# Patient Record
Sex: MALE | Race: OTHER | HISPANIC OR LATINO
[De-identification: names, ages, dates, MRNs, and addresses within clinical notes are randomized per-mention and may not be internally consistent; named-entity substitution may affect disease eponyms.]

---

## 2018-08-15 PROBLEM — Z00.00 ENCOUNTER FOR PREVENTIVE HEALTH EXAMINATION: Status: ACTIVE | Noted: 2018-08-15

## 2018-08-16 ENCOUNTER — APPOINTMENT (OUTPATIENT)
Dept: GASTROENTEROLOGY | Facility: CLINIC | Age: 38
End: 2018-08-16
Payer: COMMERCIAL

## 2018-08-16 VITALS
SYSTOLIC BLOOD PRESSURE: 110 MMHG | HEART RATE: 87 BPM | HEIGHT: 73 IN | DIASTOLIC BLOOD PRESSURE: 93 MMHG | BODY MASS INDEX: 41.75 KG/M2 | WEIGHT: 315 LBS

## 2018-08-16 DIAGNOSIS — Z83.71 FAMILY HISTORY OF COLONIC POLYPS: ICD-10-CM

## 2018-08-16 DIAGNOSIS — R07.89 OTHER CHEST PAIN: ICD-10-CM

## 2018-08-16 DIAGNOSIS — Z83.79 FAMILY HISTORY OF OTHER DISEASES OF THE DIGESTIVE SYSTEM: ICD-10-CM

## 2018-08-16 DIAGNOSIS — R14.0 ABDOMINAL DISTENSION (GASEOUS): ICD-10-CM

## 2018-08-16 DIAGNOSIS — Z84.89 FAMILY HISTORY OF OTHER SPECIFIED CONDITIONS: ICD-10-CM

## 2018-08-16 DIAGNOSIS — R13.10 DYSPHAGIA, UNSPECIFIED: ICD-10-CM

## 2018-08-16 DIAGNOSIS — Z80.0 FAMILY HISTORY OF MALIGNANT NEOPLASM OF DIGESTIVE ORGANS: ICD-10-CM

## 2018-08-16 DIAGNOSIS — Z86.59 PERSONAL HISTORY OF OTHER MENTAL AND BEHAVIORAL DISORDERS: ICD-10-CM

## 2018-08-16 DIAGNOSIS — Z87.891 PERSONAL HISTORY OF NICOTINE DEPENDENCE: ICD-10-CM

## 2018-08-16 DIAGNOSIS — Z80.3 FAMILY HISTORY OF MALIGNANT NEOPLASM OF BREAST: ICD-10-CM

## 2018-08-16 DIAGNOSIS — M15.9 POLYOSTEOARTHRITIS, UNSPECIFIED: ICD-10-CM

## 2018-08-16 PROCEDURE — 82274 ASSAY TEST FOR BLOOD FECAL: CPT | Mod: QW

## 2018-08-16 PROCEDURE — 99244 OFF/OP CNSLTJ NEW/EST MOD 40: CPT

## 2018-08-17 ENCOUNTER — APPOINTMENT (OUTPATIENT)
Dept: GASTROENTEROLOGY | Facility: CLINIC | Age: 38
End: 2018-08-17

## 2018-11-22 ENCOUNTER — TRANSCRIPTION ENCOUNTER (OUTPATIENT)
Age: 38
End: 2018-11-22

## 2022-12-12 ENCOUNTER — APPOINTMENT (OUTPATIENT)
Dept: INTERNAL MEDICINE | Facility: CLINIC | Age: 42
End: 2022-12-12

## 2022-12-12 ENCOUNTER — NON-APPOINTMENT (OUTPATIENT)
Age: 42
End: 2022-12-12

## 2022-12-12 VITALS
HEIGHT: 73 IN | RESPIRATION RATE: 18 BRPM | SYSTOLIC BLOOD PRESSURE: 135 MMHG | WEIGHT: 315 LBS | HEART RATE: 72 BPM | OXYGEN SATURATION: 97 % | BODY MASS INDEX: 41.75 KG/M2 | DIASTOLIC BLOOD PRESSURE: 84 MMHG | TEMPERATURE: 97.3 F

## 2022-12-12 DIAGNOSIS — S86.011A STRAIN OF RIGHT ACHILLES TENDON, INITIAL ENCOUNTER: ICD-10-CM

## 2022-12-12 DIAGNOSIS — Z76.89 PERSONS ENCOUNTERING HEALTH SERVICES IN OTHER SPECIFIED CIRCUMSTANCES: ICD-10-CM

## 2022-12-12 DIAGNOSIS — Z83.3 FAMILY HISTORY OF DIABETES MELLITUS: ICD-10-CM

## 2022-12-12 PROCEDURE — 99203 OFFICE O/P NEW LOW 30 MIN: CPT

## 2022-12-12 RX ORDER — RANITIDINE HYDROCHLORIDE 150 MG/1
150 TABLET, FILM COATED ORAL
Refills: 0 | Status: DISCONTINUED | COMMUNITY
End: 2022-12-12

## 2022-12-12 RX ORDER — OMEPRAZOLE MAGNESIUM 40 MG/1
40 CAPSULE, DELAYED RELEASE ORAL
Refills: 0 | Status: DISCONTINUED | COMMUNITY
End: 2022-12-12

## 2022-12-12 NOTE — HISTORY OF PRESENT ILLNESS
[de-identified] : 42 years old male here to establish care, he refers h/o right Achilles tendon tear, requiring surgery, schedule for surgery next week,needs medical clearance [FreeTextEntry1] : establish care

## 2022-12-12 NOTE — PLAN
[FreeTextEntry1] : * routine general labs done\par * age appropriate health maintenance recommendations reviewed, discussed including healthy diet, regular exercise\par * patient will have preoperative labs tomorrow\par * will fax MCA form to complete\par * follow up one month.

## 2022-12-12 NOTE — HEALTH RISK ASSESSMENT
[Good] : ~his/her~  mood as  good [Former] : Former [5-9] : 5-9 [No] : No [No falls in past year] : Patient reported no falls in the past year [0] : 2) Feeling down, depressed, or hopeless: Not at all (0) [PHQ-2 Negative - No further assessment needed] : PHQ-2 Negative - No further assessment needed [HIV test declined] : HIV test declined [Hepatitis C test declined] : Hepatitis C test declined [None] : None [With Family] : lives with family [Employed] : employed [College] : College [] :  [Sexually Active] : sexually active [Feels Safe at Home] : Feels safe at home [Fully functional (bathing, dressing, toileting, transferring, walking, feeding)] : Fully functional (bathing, dressing, toileting, transferring, walking, feeding) [Fully functional (using the telephone, shopping, preparing meals, housekeeping, doing laundry, using] : Fully functional and needs no help or supervision to perform IADLs (using the telephone, shopping, preparing meals, housekeeping, doing laundry, using transportation, managing medications and managing finances) [Smoke Detector] : smoke detector [Carbon Monoxide Detector] : carbon monoxide detector [Seat Belt] :  uses seat belt [YearQuit] : 2008 [JEG5Nojad] : 0 [Change in mental status noted] : No change in mental status noted [Reports changes in hearing] : Reports no changes in hearing [Reports changes in vision] : Reports no changes in vision [Reports changes in dental health] : Reports no changes in dental health [Guns at Home] : no guns at home [FreeTextEntry2] :

## 2022-12-13 ENCOUNTER — OUTPATIENT (OUTPATIENT)
Dept: OUTPATIENT SERVICES | Facility: HOSPITAL | Age: 42
LOS: 1 days | End: 2022-12-13
Payer: COMMERCIAL

## 2022-12-13 VITALS
WEIGHT: 315 LBS | DIASTOLIC BLOOD PRESSURE: 84 MMHG | SYSTOLIC BLOOD PRESSURE: 135 MMHG | TEMPERATURE: 97 F | HEIGHT: 73 IN | RESPIRATION RATE: 13 BRPM | HEART RATE: 80 BPM | OXYGEN SATURATION: 98 %

## 2022-12-13 DIAGNOSIS — Z01.818 ENCOUNTER FOR OTHER PREPROCEDURAL EXAMINATION: ICD-10-CM

## 2022-12-13 DIAGNOSIS — S86.011A STRAIN OF RIGHT ACHILLES TENDON, INITIAL ENCOUNTER: ICD-10-CM

## 2022-12-13 DIAGNOSIS — M65.28 CALCIFIC TENDINITIS, OTHER SITE: ICD-10-CM

## 2022-12-13 DIAGNOSIS — Z87.09 PERSONAL HISTORY OF OTHER DISEASES OF THE RESPIRATORY SYSTEM: Chronic | ICD-10-CM

## 2022-12-13 LAB
25(OH)D3 SERPL-MCNC: 32.8 NG/ML
ALBUMIN SERPL ELPH-MCNC: 4.3 G/DL
ALP BLD-CCNC: 77 U/L
ALT SERPL-CCNC: 38 U/L
ANION GAP SERPL CALC-SCNC: 13 MMOL/L
APPEARANCE: CLEAR
AST SERPL-CCNC: 27 U/L
BACTERIA: NEGATIVE
BASOPHILS # BLD AUTO: 0.05 K/UL
BASOPHILS NFR BLD AUTO: 0.6 %
BILIRUB SERPL-MCNC: 0.6 MG/DL
BILIRUBIN URINE: NEGATIVE
BLOOD URINE: NEGATIVE
BUN SERPL-MCNC: 11 MG/DL
CALCIUM SERPL-MCNC: 9.4 MG/DL
CHLORIDE SERPL-SCNC: 100 MMOL/L
CHOLEST SERPL-MCNC: 191 MG/DL
CO2 SERPL-SCNC: 27 MMOL/L
COLOR: NORMAL
CREAT SERPL-MCNC: 0.88 MG/DL
EGFR: 110 ML/MIN/1.73M2
EOSINOPHIL # BLD AUTO: 0.15 K/UL
EOSINOPHIL NFR BLD AUTO: 1.7 %
ESTIMATED AVERAGE GLUCOSE: 105 MG/DL
GLUCOSE QUALITATIVE U: NEGATIVE
GLUCOSE SERPL-MCNC: 78 MG/DL
HBA1C MFR BLD HPLC: 5.3 %
HCT VFR BLD CALC: 48.9 %
HDLC SERPL-MCNC: 55 MG/DL
HGB BLD-MCNC: 15.2 G/DL
HYALINE CASTS: 0 /LPF
IMM GRANULOCYTES NFR BLD AUTO: 0.3 %
KETONES URINE: NEGATIVE
LDLC SERPL CALC-MCNC: 115 MG/DL
LEUKOCYTE ESTERASE URINE: NEGATIVE
LYMPHOCYTES # BLD AUTO: 2.04 K/UL
LYMPHOCYTES NFR BLD AUTO: 22.7 %
MAN DIFF?: NORMAL
MCHC RBC-ENTMCNC: 29 PG
MCHC RBC-ENTMCNC: 31.1 GM/DL
MCV RBC AUTO: 93.3 FL
MICROSCOPIC-UA: NORMAL
MONOCYTES # BLD AUTO: 0.78 K/UL
MONOCYTES NFR BLD AUTO: 8.7 %
NEUTROPHILS # BLD AUTO: 5.93 K/UL
NEUTROPHILS NFR BLD AUTO: 66 %
NITRITE URINE: NEGATIVE
NONHDLC SERPL-MCNC: 137 MG/DL
PH URINE: 6
PLATELET # BLD AUTO: 308 K/UL
POTASSIUM SERPL-SCNC: 4.1 MMOL/L
PROT SERPL-MCNC: 7.4 G/DL
PROTEIN URINE: NEGATIVE
RBC # BLD: 5.24 M/UL
RBC # FLD: 13.2 %
RED BLOOD CELLS URINE: 2 /HPF
SODIUM SERPL-SCNC: 139 MMOL/L
SPECIFIC GRAVITY URINE: 1.02
SQUAMOUS EPITHELIAL CELLS: 0 /HPF
T4 FREE SERPL-MCNC: 1.2 NG/DL
TRIGL SERPL-MCNC: 108 MG/DL
TSH SERPL-ACNC: 0.49 UIU/ML
UROBILINOGEN URINE: NORMAL
VIT B12 SERPL-MCNC: 893 PG/ML
WBC # FLD AUTO: 8.98 K/UL
WHITE BLOOD CELLS URINE: 1 /HPF

## 2022-12-13 PROCEDURE — G0463: CPT

## 2022-12-13 RX ORDER — VALACYCLOVIR 500 MG/1
1 TABLET, FILM COATED ORAL
Qty: 0 | Refills: 0 | DISCHARGE

## 2022-12-13 NOTE — H&P PST ADULT - ATTENDING COMMENTS
the patient is seen and evaluated  risks and benefits have been discussed   consent has been obtained  will plan on surgery

## 2022-12-13 NOTE — H&P PST ADULT - NSICDXFAMILYHX_GEN_ALL_CORE_FT
FAMILY HISTORY:  Father  Still living? No  Family history of liver failure, Age at diagnosis: Age Unknown    Mother  Still living? Unknown  Family history of diabetes mellitus, Age at diagnosis: Age Unknown    Sibling  Still living? Yes, Estimated age: Age Unknown  Family history of diabetes mellitus, Age at diagnosis: Age Unknown    Aunt  Still living? No  Family history- stomach cancer, Age at diagnosis: Age Unknown    Uncle  Still living? No  Family history of brain tumor, Age at diagnosis: Age Unknown

## 2022-12-13 NOTE — H&P PST ADULT - NSICDXPASTMEDICALHX_GEN_ALL_CORE_FT
PAST MEDICAL HISTORY:  Achilles rupture, right     COVID-19 vaccine series completed     Dysphagia     Genital herpes     Morbid obesity with BMI of 45.0-49.9, adult     Sleep apnea

## 2022-12-13 NOTE — H&P PST ADULT - PROBLEM SELECTOR PLAN 1
Right achilles tendon repair is planned for 12/21/2022  Covid testing is scheduled for 12/19/2022 @ [facility]  Medical clearance requested  pre op instructions were reviewed  best wishes offered

## 2022-12-13 NOTE — H&P PST ADULT - HISTORY OF PRESENT ILLNESS
42 male reports pain in right heel since 9/2022.  He is diagnosed with right achilles tendon rupture.   He is scheduled for right achilles tendon repair on 12/21/2022 @ [facility].

## 2022-12-13 NOTE — H&P PST ADULT - MUSCULOSKELETAL
details… no joint swelling/no joint erythema/no joint warmth/no calf tenderness/no chest wall tenderness

## 2022-12-19 ENCOUNTER — OUTPATIENT (OUTPATIENT)
Dept: OUTPATIENT SERVICES | Facility: HOSPITAL | Age: 42
LOS: 1 days | End: 2022-12-19
Payer: COMMERCIAL

## 2022-12-19 DIAGNOSIS — Z87.09 PERSONAL HISTORY OF OTHER DISEASES OF THE RESPIRATORY SYSTEM: Chronic | ICD-10-CM

## 2022-12-19 DIAGNOSIS — Z20.828 CONTACT WITH AND (SUSPECTED) EXPOSURE TO OTHER VIRAL COMMUNICABLE DISEASES: ICD-10-CM

## 2022-12-19 LAB — SARS-COV-2 RNA SPEC QL NAA+PROBE: SIGNIFICANT CHANGE UP

## 2022-12-19 PROCEDURE — U0005: CPT

## 2022-12-19 PROCEDURE — U0003: CPT

## 2022-12-20 ENCOUNTER — TRANSCRIPTION ENCOUNTER (OUTPATIENT)
Age: 42
End: 2022-12-20

## 2022-12-20 RX ORDER — CHLORHEXIDINE GLUCONATE 213 G/1000ML
1 SOLUTION TOPICAL DAILY
Refills: 0 | Status: DISCONTINUED | OUTPATIENT
Start: 2022-12-21 | End: 2023-01-04

## 2022-12-21 ENCOUNTER — TRANSCRIPTION ENCOUNTER (OUTPATIENT)
Age: 42
End: 2022-12-21

## 2022-12-21 ENCOUNTER — OUTPATIENT (OUTPATIENT)
Dept: OUTPATIENT SERVICES | Facility: HOSPITAL | Age: 42
LOS: 1 days | End: 2022-12-21
Payer: COMMERCIAL

## 2022-12-21 VITALS
RESPIRATION RATE: 16 BRPM | DIASTOLIC BLOOD PRESSURE: 75 MMHG | HEART RATE: 69 BPM | SYSTOLIC BLOOD PRESSURE: 125 MMHG | OXYGEN SATURATION: 96 %

## 2022-12-21 VITALS
DIASTOLIC BLOOD PRESSURE: 87 MMHG | TEMPERATURE: 99 F | SYSTOLIC BLOOD PRESSURE: 157 MMHG | HEIGHT: 73 IN | OXYGEN SATURATION: 94 % | HEART RATE: 85 BPM | WEIGHT: 315 LBS | RESPIRATION RATE: 15 BRPM

## 2022-12-21 DIAGNOSIS — Z87.09 PERSONAL HISTORY OF OTHER DISEASES OF THE RESPIRATORY SYSTEM: Chronic | ICD-10-CM

## 2022-12-21 DIAGNOSIS — M65.28 CALCIFIC TENDINITIS, OTHER SITE: ICD-10-CM

## 2022-12-21 PROCEDURE — 76000 FLUOROSCOPY <1 HR PHYS/QHP: CPT

## 2022-12-21 PROCEDURE — 27650 REPAIR ACHILLES TENDON: CPT | Mod: RT

## 2022-12-21 PROCEDURE — C9399: CPT

## 2022-12-21 PROCEDURE — 28119 REMOVAL OF HEEL SPUR: CPT | Mod: RT

## 2022-12-21 PROCEDURE — C1713: CPT

## 2022-12-21 DEVICE — IMP SYS BIO-COMP ACHILLES SPEEDBRIDGE: Type: IMPLANTABLE DEVICE | Site: RIGHT | Status: FUNCTIONAL

## 2022-12-21 RX ORDER — HYDROMORPHONE HYDROCHLORIDE 2 MG/ML
1 INJECTION INTRAMUSCULAR; INTRAVENOUS; SUBCUTANEOUS
Refills: 0 | Status: DISCONTINUED | OUTPATIENT
Start: 2022-12-21 | End: 2022-12-22

## 2022-12-21 RX ORDER — OXYCODONE HYDROCHLORIDE 5 MG/1
5 TABLET ORAL ONCE
Refills: 0 | Status: DISCONTINUED | OUTPATIENT
Start: 2022-12-21 | End: 2022-12-22

## 2022-12-21 RX ORDER — HYDROMORPHONE HYDROCHLORIDE 2 MG/ML
0.5 INJECTION INTRAMUSCULAR; INTRAVENOUS; SUBCUTANEOUS
Refills: 0 | Status: DISCONTINUED | OUTPATIENT
Start: 2022-12-21 | End: 2022-12-22

## 2022-12-21 RX ORDER — ONDANSETRON 8 MG/1
4 TABLET, FILM COATED ORAL ONCE
Refills: 0 | Status: DISCONTINUED | OUTPATIENT
Start: 2022-12-21 | End: 2022-12-22

## 2022-12-21 RX ORDER — SODIUM CHLORIDE 9 MG/ML
1000 INJECTION, SOLUTION INTRAVENOUS
Refills: 0 | Status: DISCONTINUED | OUTPATIENT
Start: 2022-12-21 | End: 2022-12-22

## 2022-12-21 RX ORDER — APREPITANT 80 MG/1
40 CAPSULE ORAL ONCE
Refills: 0 | Status: COMPLETED | OUTPATIENT
Start: 2022-12-21 | End: 2022-12-21

## 2022-12-21 RX ORDER — OXYCODONE HYDROCHLORIDE 5 MG/1
1 TABLET ORAL
Qty: 0 | Refills: 0 | DISCHARGE
Start: 2022-12-21

## 2022-12-21 RX ORDER — CEFAZOLIN SODIUM 1 G
3000 VIAL (EA) INJECTION ONCE
Refills: 0 | Status: COMPLETED | OUTPATIENT
Start: 2022-12-21 | End: 2022-12-21

## 2022-12-21 RX ADMIN — SODIUM CHLORIDE 75 MILLILITER(S): 9 INJECTION, SOLUTION INTRAVENOUS at 13:37

## 2022-12-21 RX ADMIN — HYDROMORPHONE HYDROCHLORIDE 0.5 MILLIGRAM(S): 2 INJECTION INTRAMUSCULAR; INTRAVENOUS; SUBCUTANEOUS at 13:36

## 2022-12-21 RX ADMIN — HYDROMORPHONE HYDROCHLORIDE 0.5 MILLIGRAM(S): 2 INJECTION INTRAMUSCULAR; INTRAVENOUS; SUBCUTANEOUS at 13:50

## 2022-12-21 RX ADMIN — HYDROMORPHONE HYDROCHLORIDE 0.5 MILLIGRAM(S): 2 INJECTION INTRAMUSCULAR; INTRAVENOUS; SUBCUTANEOUS at 14:05

## 2022-12-21 RX ADMIN — APREPITANT 40 MILLIGRAM(S): 80 CAPSULE ORAL at 09:10

## 2022-12-21 RX ADMIN — HYDROMORPHONE HYDROCHLORIDE 0.5 MILLIGRAM(S): 2 INJECTION INTRAMUSCULAR; INTRAVENOUS; SUBCUTANEOUS at 13:52

## 2022-12-21 NOTE — ASU DISCHARGE PLAN (ADULT/PEDIATRIC) - NS MD DC FALL RISK RISK
For information on Fall & Injury Prevention, visit: [email or web address] OR  [email or web address] OR  https://www.cdc.gov/steadi/patient.html

## 2022-12-21 NOTE — BRIEF OPERATIVE NOTE - NSICDXBRIEFPREOP_GEN_ALL_CORE_FT
PRE-OP DIAGNOSIS:  Achilles tendinosis of right ankle 21-Dec-2022 09:52:06  Tony Taylor  Miguel Angel's deformity of right heel 21-Dec-2022 09:52:16  Tony Taylor

## 2022-12-21 NOTE — BRIEF OPERATIVE NOTE - NSICDXBRIEFPROCEDURE_GEN_ALL_CORE_FT
PROCEDURES:  Debridement of Achilles tendon 21-Dec-2022 09:51:32 right Tony Taylor  Excision of Miguel Angel's deformity of right heel 21-Dec-2022 09:51:51  Tony Taylor

## 2022-12-21 NOTE — ASU DISCHARGE PLAN (ADULT/PEDIATRIC) - CARE PROVIDER_API CALL
Jagjit Holley)  Orthopaedic Surgery  [address] [place] [address]  Phone: [phone]  Fax: [phone]  Follow Up Time:

## 2022-12-21 NOTE — BRIEF OPERATIVE NOTE - NSICDXBRIEFPOSTOP_GEN_ALL_CORE_FT
POST-OP DIAGNOSIS:  Achilles tendinosis of right ankle 21-Dec-2022 09:52:36  Tony Taylor  Miguel Angel's deformity of right heel 21-Dec-2022 09:52:50  Tony Taylor

## 2022-12-21 NOTE — ASU DISCHARGE PLAN (ADULT/PEDIATRIC) - ASU DC SPECIAL INSTRUCTIONSFT
Non-weightbearing on the operative side  Elevate extremity, keep splint clean and dry until follow up visit  Medications sent to pharmacy  Take medications as prescribed, aspirin daily  Follow up in office in 2 weeks  Call office at [phone] if any questions or concerns

## 2022-12-21 NOTE — ASU PREOP CHECKLIST - INTERNAL PROSTHESES
Call GI to schedule a follow-up appointment regarding this problem.  Drink plenty of water.  Increase fiber in your diet.    Please return to the Emergency Department for any new or worsening symptoms including: increased bleeding, abdominal pain, fever, chest pain, shortness of breath, loss of consciousness, dizziness, weakness, or any other concerns.     Please follow up with your Primary Care Provider within in the week. If you do not have one, you may contact the one listed on your discharge paperwork or you may also call the [facility] Appointment Desk at [phone] to schedule an appointment with one.     Please take all medication as prescribed.     no

## 2022-12-21 NOTE — ASU PATIENT PROFILE, ADULT - NSSUBSTANCEUSE_GEN_ALL_CORE_SD
Patient:   BETTYE SLATER            MRN: [id number]            FIN: [id number]               Age:   78 years     Sex:  MALE     :  40   Associated Diagnoses:   Other meniscus derangements, other medial meniscus, left knee   Author:   KAYLENE WILSON      Postoperative Information      Postoperative Information   Postoperative Information:          Preoperative Diagnosis: Other meniscus derangements, other medial meniscus, left knee (TWP30-AO M23.332, Diagnosis, Medical),    .         Postoperative Diagnosis: Same As Pre-Op Dx,    .         Performed by:    KAYLENE WILSON (Surgeon - Primary)  .         Assistant:    MARLENE GUNTER (Surgeon - Assistant)  ALEXANDRE GREEN (Surgeon - Resident)  .         Procedures Performed:    Knee Arthroscopy, LEFT KNEE ARTHROSCOPY WITH PARTIAL MEDIAL MENISCECTOMY  .         Findings: No Abnormal Findings.         Specimens Removed: None,    .         Estimated Blood Loss: 5  ml,    .         Blood Administered: No.         Complications: None.         Grafts/Implants: None.    Anesthetic utilized:     General  NIKHIL, SUN RAMIREZ (Supervisor)  .             Electronically Signed On 2018 13:59  __________________________________________________   KAYLENE WILSON     caffeine

## 2023-03-21 ENCOUNTER — TRANSCRIPTION ENCOUNTER (OUTPATIENT)
Age: 43
End: 2023-03-21

## 2023-03-21 DIAGNOSIS — B00.9 HERPESVIRAL INFECTION, UNSPECIFIED: ICD-10-CM

## 2023-03-21 PROBLEM — E66.01 MORBID (SEVERE) OBESITY DUE TO EXCESS CALORIES: Chronic | Status: ACTIVE | Noted: 2022-12-13

## 2023-03-21 PROBLEM — Z92.29 PERSONAL HISTORY OF OTHER DRUG THERAPY: Chronic | Status: ACTIVE | Noted: 2022-12-13

## 2023-03-21 PROBLEM — S86.011A STRAIN OF RIGHT ACHILLES TENDON, INITIAL ENCOUNTER: Chronic | Status: ACTIVE | Noted: 2022-12-13

## 2023-03-21 PROBLEM — R13.10 DYSPHAGIA, UNSPECIFIED: Chronic | Status: ACTIVE | Noted: 2022-12-13

## 2023-03-21 PROBLEM — A60.00 HERPESVIRAL INFECTION OF UROGENITAL SYSTEM, UNSPECIFIED: Chronic | Status: ACTIVE | Noted: 2022-12-13

## 2023-03-21 PROBLEM — G47.30 SLEEP APNEA, UNSPECIFIED: Chronic | Status: ACTIVE | Noted: 2022-12-13

## 2023-04-02 ENCOUNTER — TRANSCRIPTION ENCOUNTER (OUTPATIENT)
Age: 43
End: 2023-04-02

## 2023-04-04 ENCOUNTER — APPOINTMENT (OUTPATIENT)
Dept: INTERNAL MEDICINE | Facility: CLINIC | Age: 43
End: 2023-04-04
Payer: COMMERCIAL

## 2023-04-04 DIAGNOSIS — U07.1 COVID-19: ICD-10-CM

## 2023-04-04 PROCEDURE — 99213 OFFICE O/P EST LOW 20 MIN: CPT | Mod: 95

## 2023-04-04 NOTE — PLAN
[FreeTextEntry1] : * instructed to remain in quarantine until tomorrow\par * repeat home COVID test tomorrow\par * continue with good oral hydration. c/w Mucinex\par * advised to take vitamin D 3; vitamin C and Zinc\par * will order repeat labs for lipids\par * to schedule f/u one month.\par * instructed to call back if symptoms worsen

## 2023-04-04 NOTE — HISTORY OF PRESENT ILLNESS
[Home] : at home, [unfilled] , at the time of the visit. [Medical Office: ([facility])___] : at the medical office located in  [Verbal consent obtained from patient] : the patient, [unfilled] [FreeTextEntry8] : Telehealth visit was delivered after patient consent was obtained; patient agrees to discuss health issues via video conference. Risk/limitations involved in medical telehealth visits were reviewed, including limitations of physical exam. Patient was asked for identifiers by stating name and ; patient was asked to place him/her self in private area. Patient understand that an office visit may be indicated after telehealth visit if no symptomatic improvement. Patient should contact us for worsening symptoms or go to ER.\par 42 years old male complaints of testing COVID positive, past Saturday, symptoms started Friday 3/31 with sore throat , mild headache , body aches, and fever for two days, fever resolved , complaints of nasal congestion and still with mild sore throat, mild cough, denies chest pain , no SOB, he states feeling better, drinking a lot of water and fluids, and taking Mucinex ; today is 5th day after symptoms started; he seems in no distress, speaks full sentences , quarantine in a room at home; time spent 25 minutes.

## 2023-04-08 ENCOUNTER — TRANSCRIPTION ENCOUNTER (OUTPATIENT)
Age: 43
End: 2023-04-08

## 2023-04-10 ENCOUNTER — TRANSCRIPTION ENCOUNTER (OUTPATIENT)
Age: 43
End: 2023-04-10

## 2023-04-10 DIAGNOSIS — R09.81 NASAL CONGESTION: ICD-10-CM

## 2023-04-15 ENCOUNTER — NON-APPOINTMENT (OUTPATIENT)
Age: 43
End: 2023-04-15

## 2023-04-15 ENCOUNTER — TRANSCRIPTION ENCOUNTER (OUTPATIENT)
Age: 43
End: 2023-04-15

## 2023-05-01 ENCOUNTER — TRANSCRIPTION ENCOUNTER (OUTPATIENT)
Age: 43
End: 2023-05-01

## 2023-05-19 ENCOUNTER — APPOINTMENT (OUTPATIENT)
Dept: NEUROLOGY | Facility: CLINIC | Age: 43
End: 2023-05-19
Payer: COMMERCIAL

## 2023-05-19 VITALS
SYSTOLIC BLOOD PRESSURE: 150 MMHG | WEIGHT: 315 LBS | HEIGHT: 73 IN | HEART RATE: 103 BPM | BODY MASS INDEX: 41.75 KG/M2 | DIASTOLIC BLOOD PRESSURE: 93 MMHG

## 2023-05-19 DIAGNOSIS — R41.9 UNSPECIFIED SYMPTOMS AND SIGNS INVOLVING COGNITIVE FUNCTIONS AND AWARENESS: ICD-10-CM

## 2023-05-19 PROCEDURE — 99204 OFFICE O/P NEW MOD 45 MIN: CPT

## 2023-05-19 NOTE — PHYSICAL EXAM
[Person] : oriented to person [Place] : oriented to place [Time] : oriented to time [Concentration Intact] : normal concentrating ability [Naming Objects] : no difficulty naming common objects [Fluency] : fluency intact [Comprehension] : comprehension intact [Reading] : reading intact [Past History] : adequate knowledge of personal past history [Cranial Nerves Optic (II)] : visual acuity intact bilaterally,  visual fields full to confrontation, pupils equal round and reactive to light [Cranial Nerves Oculomotor (III)] : extraocular motion intact [Cranial Nerves Trigeminal (V)] : facial sensation intact symmetrically [Cranial Nerves Facial (VII)] : face symmetrical [Cranial Nerves Vestibulocochlear (VIII)] : hearing was intact bilaterally [Cranial Nerves Glossopharyngeal (IX)] : tongue and palate midline [Cranial Nerves Accessory (XI - Cranial And Spinal)] : head turning and shoulder shrug symmetric [Cranial Nerves Hypoglossal (XII)] : there was no tongue deviation with protrusion [Motor Tone] : muscle tone was normal in all four extremities [Motor Strength] : muscle strength was normal in all four extremities [No Muscle Atrophy] : normal bulk in all four extremities [Sensation Tactile Decrease] : light touch was intact [Abnormal Walk] : normal gait [Balance] : balance was intact [Short Term Intact] : short term memory intact [Motor Handedness Right-Handed] : the patient is right hand dominant [PERRL With Normal Accommodation] : pupils were equal in size, round, reactive to light, with normal accommodation [Extraocular Movements] : extraocular movements were intact [] : no respiratory distress [Involuntary Movements] : no involuntary movements were seen [General Appearance - In No Acute Distress] : in no acute distress [Affect] : the affect was normal [Current Events] : adequate knowledge of current events [Past-pointing] : there was no past-pointing [Tremor] : no tremor present [Plantar Reflex Right Only] : normal on the right [Plantar Reflex Left Only] : normal on the left [FreeTextEntry4] : recall 3/3 i [FreeTextEntry9] : DTR +1 [Antalgic Gait Right] : antalgic on the right

## 2023-05-19 NOTE — HISTORY OF PRESENT ILLNESS
[FreeTextEntry1] : 42 year old m  h/o ?ADHD, anxiety presents today with complaints of brain fog.\par \par He reports he was in his usual state of healthy until 4.1.23 when he tested positive for covid. had fever, sinus congestion, lack of smell.. rested and symptomatic  treatment. This lasted 2.5 weeks.  symptoms resolved,but  noticed  his  was more forgetful. forgets appts forgets conversations he  has to write thiings down.\par lack of motivation \par was  vaccinated and boosted \par \par \par He has been seeing psychiatry  for past  couple of weeks and has been stared on Adderall for his focusing difficulties  which  he has had since  in high school. w\par \par works as an artictiual   \par \par sleep is \par has been under a lot of stressors, lost his cat  and has not been working.  feels he cannot return to work. \par \par  \par

## 2023-05-19 NOTE — REVIEW OF SYSTEMS
[Negative] : Heme/Lymph [As Noted in HPI] : as noted in HPI [Suicidal] : not suicidal [Sleep Disturbances] : sleep disturbances [Anxiety] : anxiety [Joint Pain] : joint pain

## 2023-05-19 NOTE — ASSESSMENT
[FreeTextEntry1] : 42 year old m  h/o ?ADHD, anxiety SHARONDA  presents today with  cognitive complaints s/p covid infection 6 weeks ago also with recent loss of pet , currently grieving,  affecting his ADLs. neuro exam non focal. will obtain Brain imaging and EEG \par continue to follow with psychiatry \par consider Neuropsych evaluation\par further evaluation  after above findings. \par \par

## 2023-05-23 ENCOUNTER — APPOINTMENT (OUTPATIENT)
Dept: INTERNAL MEDICINE | Facility: CLINIC | Age: 43
End: 2023-05-23
Payer: COMMERCIAL

## 2023-05-23 VITALS
OXYGEN SATURATION: 96 % | HEART RATE: 85 BPM | WEIGHT: 315 LBS | HEIGHT: 73 IN | BODY MASS INDEX: 41.75 KG/M2 | DIASTOLIC BLOOD PRESSURE: 85 MMHG | SYSTOLIC BLOOD PRESSURE: 140 MMHG | TEMPERATURE: 97.7 F

## 2023-05-23 DIAGNOSIS — U09.9 MYALGIC ENCEPHALOMYELITIS/CHRONIC FATIGUE SYNDROME: ICD-10-CM

## 2023-05-23 DIAGNOSIS — G93.32 MYALGIC ENCEPHALOMYELITIS/CHRONIC FATIGUE SYNDROME: ICD-10-CM

## 2023-05-23 LAB
25(OH)D3 SERPL-MCNC: 32.5 NG/ML
FOLATE SERPL-MCNC: 10.1 NG/ML
VIT B12 SERPL-MCNC: 920 PG/ML

## 2023-05-23 PROCEDURE — 99214 OFFICE O/P EST MOD 30 MIN: CPT

## 2023-05-23 NOTE — PLAN
[FreeTextEntry1] : * labs for lipids, cmp, vitamin B 12, folic acid, vitamin d level\par * schedule for brain MRI, and EEG\par * to f/u with neurologist\par * referral for pulmonary medicine\par * f/u with psychiatry \par * recommend to try vitamin coctail of vitamin C, D3, B12, folic acid, zinc and turmeric\par * patient now unable to work, due to long covid , struggling financially; to apply for SSI \par * f/u one month

## 2023-05-23 NOTE — HISTORY OF PRESENT ILLNESS
[FreeTextEntry1] : follow up\par  [de-identified] : 42 years old male presents for follow up after COVID infection last month, he developed fatigue, lack of concentration, memory impairment; he feels extremely fatigued, lack of motivation.

## 2023-05-24 LAB
ALBUMIN SERPL ELPH-MCNC: 4.7 G/DL
ALP BLD-CCNC: 82 U/L
ALT SERPL-CCNC: 31 U/L
ANION GAP SERPL CALC-SCNC: 13 MMOL/L
AST SERPL-CCNC: 24 U/L
BILIRUB SERPL-MCNC: 0.6 MG/DL
BUN SERPL-MCNC: 10 MG/DL
CALCIUM SERPL-MCNC: 9.7 MG/DL
CHLORIDE SERPL-SCNC: 104 MMOL/L
CHOLEST SERPL-MCNC: 177 MG/DL
CO2 SERPL-SCNC: 26 MMOL/L
CREAT SERPL-MCNC: 0.9 MG/DL
EGFR: 109 ML/MIN/1.73M2
GLUCOSE SERPL-MCNC: 90 MG/DL
HDLC SERPL-MCNC: 53 MG/DL
LDLC SERPL CALC-MCNC: 104 MG/DL
NONHDLC SERPL-MCNC: 124 MG/DL
POTASSIUM SERPL-SCNC: 4.1 MMOL/L
PROT SERPL-MCNC: 7.7 G/DL
SODIUM SERPL-SCNC: 143 MMOL/L
TRIGL SERPL-MCNC: 97 MG/DL

## 2023-06-19 ENCOUNTER — APPOINTMENT (OUTPATIENT)
Dept: MRI IMAGING | Facility: CLINIC | Age: 43
End: 2023-06-19

## 2023-06-19 ENCOUNTER — OUTPATIENT (OUTPATIENT)
Dept: OUTPATIENT SERVICES | Facility: HOSPITAL | Age: 43
LOS: 1 days | End: 2023-06-19
Payer: COMMERCIAL

## 2023-06-19 DIAGNOSIS — R41.9 UNSPECIFIED SYMPTOMS AND SIGNS INVOLVING COGNITIVE FUNCTIONS AND AWARENESS: ICD-10-CM

## 2023-06-19 DIAGNOSIS — Z87.09 PERSONAL HISTORY OF OTHER DISEASES OF THE RESPIRATORY SYSTEM: Chronic | ICD-10-CM

## 2023-06-19 PROCEDURE — 70553 MRI BRAIN STEM W/O & W/DYE: CPT | Mod: 26

## 2023-06-20 ENCOUNTER — APPOINTMENT (OUTPATIENT)
Dept: NEUROLOGY | Facility: CLINIC | Age: 43
End: 2023-06-20

## 2023-07-06 ENCOUNTER — APPOINTMENT (OUTPATIENT)
Dept: INTERNAL MEDICINE | Facility: CLINIC | Age: 43
End: 2023-07-06
Payer: COMMERCIAL

## 2023-07-06 VITALS
HEIGHT: 73 IN | TEMPERATURE: 97.6 F | WEIGHT: 315 LBS | DIASTOLIC BLOOD PRESSURE: 88 MMHG | SYSTOLIC BLOOD PRESSURE: 143 MMHG | HEART RATE: 80 BPM | BODY MASS INDEX: 41.75 KG/M2 | OXYGEN SATURATION: 97 % | RESPIRATION RATE: 17 BRPM

## 2023-07-06 DIAGNOSIS — R29.90 UNSPECIFIED SYMPTOMS AND SIGNS INVOLVING THE NERVOUS SYSTEM: ICD-10-CM

## 2023-07-06 DIAGNOSIS — U09.9 UNSPECIFIED SYMPTOMS AND SIGNS INVOLVING THE NERVOUS SYSTEM: ICD-10-CM

## 2023-07-06 PROCEDURE — 99214 OFFICE O/P EST MOD 30 MIN: CPT

## 2023-07-06 NOTE — PLAN
[FreeTextEntry1] : * c/w adderall\par * f/u with neurologist and neuropsychologist\par * to schedule EEG\par * patient counselled on healthy diet with limited calories, regular exercises, low fat choices, limit or eliminate junk food, and to have family meals as often as possible.\par * f/u six months.

## 2023-07-06 NOTE — HISTORY OF PRESENT ILLNESS
[FreeTextEntry1] : follow up [de-identified] : 43 years old male presents for follow up, states energy level improving but still with lack of concentration and cognitive impairment, had MRI brain done, report reviewed, normal; evaluated by neurologist and neuropsychologist; has pending EEG, started on adderall .

## 2023-08-21 RX ORDER — FLUTICASONE PROPIONATE 50 UG/1
50 SPRAY, METERED NASAL DAILY
Qty: 1 | Refills: 3 | Status: ACTIVE | COMMUNITY
Start: 2023-08-21 | End: 1900-01-01

## 2023-09-05 ENCOUNTER — APPOINTMENT (OUTPATIENT)
Dept: INTERNAL MEDICINE | Facility: CLINIC | Age: 43
End: 2023-09-05
Payer: COMMERCIAL

## 2023-09-05 VITALS
OXYGEN SATURATION: 96 % | DIASTOLIC BLOOD PRESSURE: 87 MMHG | HEART RATE: 78 BPM | SYSTOLIC BLOOD PRESSURE: 140 MMHG | HEIGHT: 73 IN | WEIGHT: 315 LBS | TEMPERATURE: 97.3 F | BODY MASS INDEX: 41.75 KG/M2

## 2023-09-05 DIAGNOSIS — Z86.19 PERSONAL HISTORY OF OTHER INFECTIOUS AND PARASITIC DISEASES: ICD-10-CM

## 2023-09-05 PROCEDURE — 99214 OFFICE O/P EST MOD 30 MIN: CPT

## 2023-09-05 RX ORDER — DEXTROAMPHETAMINE SACCHARATE, AMPHETAMINE ASPARTATE, DEXTROAMPHETAMINE SULFATE AND AMPHETAMINE SULFATE 3.125; 3.125; 3.125; 3.125 MG/1; MG/1; MG/1; MG/1
12.5 TABLET ORAL
Qty: 30 | Refills: 0 | Status: ACTIVE | COMMUNITY
Start: 2023-08-31

## 2023-09-05 NOTE — HISTORY OF PRESENT ILLNESS
[FreeTextEntry8] : 43 years old male presents today c/o concern because had a cat, that tested positive for giardia, was advised by  to get tested, he denies diarrhea or abdominal pain; c/o also of chronic recurrent right nostril bleed, mild , usually stopped after packing with tissue

## 2023-09-05 NOTE — ASSESSMENT
[FreeTextEntry1] : * ENT referral for laryngoscopy * advised saline solution or fluticasone nasal spray for nasal congestion * referral for stool ova and parasite * to call back for results.

## 2023-09-06 ENCOUNTER — APPOINTMENT (OUTPATIENT)
Dept: OTOLARYNGOLOGY | Facility: CLINIC | Age: 43
End: 2023-09-06
Payer: COMMERCIAL

## 2023-09-06 ENCOUNTER — NON-APPOINTMENT (OUTPATIENT)
Age: 43
End: 2023-09-06

## 2023-09-06 VITALS
DIASTOLIC BLOOD PRESSURE: 82 MMHG | BODY MASS INDEX: 41.75 KG/M2 | WEIGHT: 315 LBS | HEIGHT: 73 IN | SYSTOLIC BLOOD PRESSURE: 129 MMHG | HEART RATE: 78 BPM

## 2023-09-06 DIAGNOSIS — J34.89 OTHER SPECIFIED DISORDERS OF NOSE AND NASAL SINUSES: ICD-10-CM

## 2023-09-06 PROCEDURE — 31238 NSL/SINS NDSC SRG NSL HEMRRG: CPT

## 2023-09-06 PROCEDURE — 99204 OFFICE O/P NEW MOD 45 MIN: CPT | Mod: 25

## 2023-09-06 NOTE — DATA REVIEWED
[No studies available for review at this time] : No studies available for review at this time [de-identified] : MRI brain: sinuses are mentioned as unremarkable

## 2023-09-06 NOTE — PHYSICAL EXAM
[Nasal Endoscopy Performed] : nasal endoscopy was performed, see procedure section for findings [Normal] : mucosa is normal [Midline] : trachea located in midline position [] : septum deviated to the left [de-identified] : Small granuloma right anterior septum

## 2023-09-06 NOTE — ASSESSMENT
[FreeTextEntry1] : 42 yo male with recurrent right sided epistaxis, history of septoplasty 10 years ago. Recurrent sinus pressure issues as well.   Right nasal cavity granuloma - patient opted for cauterization today  - continue with aquaphor twice a day, discussed proper application   Sinus pressure/nasal obstruction - patient will for discussion of possible nasal spray regimen or CT sinus, but only after nosebleeds have resolved - MRI brain: sinuses unremarkable; unclear contribution of actual nasal disease to sensation of nasal obstruction.  follow up in 2 weeks to recheck

## 2023-09-06 NOTE — CONSULT LETTER
[Dear  ___] : Dear  [unfilled], [Consult Letter:] : I had the pleasure of evaluating your patient, [unfilled]. [Please see my note below.] : Please see my note below. [Consult Closing:] : Thank you very much for allowing me to participate in the care of this patient.  If you have any questions, please do not hesitate to contact me. [Sincerely,] : Sincerely, [FreeTextEntry3] : Ronit Livingston MD [facility]

## 2023-09-06 NOTE — HISTORY OF PRESENT ILLNESS
[de-identified] : 42 yo patient with recurrent nosebleeds on the right side since septoplasty surgery about 10 years ago. He believes that allergies are causing the issue. He does not use any nasal sprays right now  He also thinks that he has nerve damage from the septoplasty as he is numb inside the right nostril up to a certain pain.  He uses Aquaphor in the nose daily. He does not have any history of prior cauterization.  The last nosebleed was a few days ago   He also mentions that he has lasix surgery years ago and was given xanax that he had never taken before. He feels that the anxiety he has now started after that and is worse when he has sinus pressure and nasal breathing obstruction. He gets intermittent sinus pressure now along with tightness in the face. Showers relieve the pressure on occasion  He has suspected allergies but does not use medications right now but has tried flonase in the past . He did have an MRI of the brain for other issues.

## 2023-09-06 NOTE — REVIEW OF SYSTEMS
[Seasonal Allergies] : seasonal allergies [As Noted in HPI] : as noted in HPI [Nasal Congestion] : nasal congestion [Nose Bleeds] : nose bleeds [Sinus Pain] : sinus pain [Sinus Pressure] : sinus pressure [Negative] : Mouth and Throat

## 2023-09-06 NOTE — END OF VISIT
[FreeTextEntry3] : I personally saw and examined ROSETTA GREENWOOD in detail.  I spoke to PRIYANK Medina regarding the assessment and plan of care. I performed the procedures and relevant physical exam.  I have reviewed the above assessment and plan of care and I agree.  I have made changes to the body of the note wherever necessary and appropriate.

## 2023-09-06 NOTE — PROCEDURE
[FreeTextEntry6] : Nasal Endoscopy (23627) scope #32  After informed verbal consent, nasal endoscopy was performed due to anterior rhinoscopy insufficient to account for symptoms. Bilateral inferior, middle and superior meati were examined, normal. The osteomeatal complexes are clear bilaterally with no polyposis or purulence. The sphenoethmoidal recesses are clear with no polyposis or purulence. No nasopharyngeal masses or lesions. The choanae are  patent. RIGHT SIDED GRANULOMA  right nasal cavity granuloma cauterization with silver nitrate performed today

## 2023-09-07 ENCOUNTER — NON-APPOINTMENT (OUTPATIENT)
Age: 43
End: 2023-09-07

## 2023-09-12 LAB — DEPRECATED O AND P PREP STL: NORMAL

## 2023-09-14 DIAGNOSIS — R10.13 EPIGASTRIC PAIN: ICD-10-CM

## 2023-09-20 ENCOUNTER — RX RENEWAL (OUTPATIENT)
Age: 43
End: 2023-09-20

## 2023-09-20 ENCOUNTER — APPOINTMENT (OUTPATIENT)
Dept: OTOLARYNGOLOGY | Facility: CLINIC | Age: 43
End: 2023-09-20
Payer: COMMERCIAL

## 2023-09-20 VITALS
DIASTOLIC BLOOD PRESSURE: 81 MMHG | HEIGHT: 73 IN | WEIGHT: 315 LBS | SYSTOLIC BLOOD PRESSURE: 135 MMHG | BODY MASS INDEX: 41.75 KG/M2 | HEART RATE: 80 BPM | TEMPERATURE: 97.6 F

## 2023-09-20 DIAGNOSIS — R09.81 NASAL CONGESTION: ICD-10-CM

## 2023-09-20 PROCEDURE — 31238 NSL/SINS NDSC SRG NSL HEMRRG: CPT | Mod: RT

## 2023-09-20 PROCEDURE — 99214 OFFICE O/P EST MOD 30 MIN: CPT | Mod: 25

## 2023-09-20 RX ORDER — AZELASTINE HYDROCHLORIDE 137 UG/1
0.1 SPRAY, METERED NASAL
Qty: 90 | Refills: 0 | Status: ACTIVE | COMMUNITY
Start: 2023-09-20 | End: 1900-01-01

## 2023-09-20 RX ORDER — AZELASTINE HYDROCHLORIDE 137 UG/1
137 SPRAY, METERED NASAL TWICE DAILY
Qty: 1 | Refills: 6 | Status: ACTIVE | COMMUNITY
Start: 2023-09-20 | End: 1900-01-01

## 2023-09-21 LAB — UREA BREATH TEST QL: NEGATIVE

## 2023-09-28 ENCOUNTER — TRANSCRIPTION ENCOUNTER (OUTPATIENT)
Age: 43
End: 2023-09-28

## 2023-09-29 ENCOUNTER — NON-APPOINTMENT (OUTPATIENT)
Age: 43
End: 2023-09-29

## 2023-11-16 ENCOUNTER — NON-APPOINTMENT (OUTPATIENT)
Age: 43
End: 2023-11-16

## 2023-12-18 ENCOUNTER — NON-APPOINTMENT (OUTPATIENT)
Age: 43
End: 2023-12-18

## 2023-12-18 ENCOUNTER — APPOINTMENT (OUTPATIENT)
Dept: INTERNAL MEDICINE | Facility: CLINIC | Age: 43
End: 2023-12-18
Payer: COMMERCIAL

## 2023-12-18 VITALS
OXYGEN SATURATION: 97 % | WEIGHT: 315 LBS | SYSTOLIC BLOOD PRESSURE: 142 MMHG | DIASTOLIC BLOOD PRESSURE: 87 MMHG | BODY MASS INDEX: 41.75 KG/M2 | RESPIRATION RATE: 17 BRPM | TEMPERATURE: 98.2 F | HEART RATE: 86 BPM | HEIGHT: 73 IN

## 2023-12-18 DIAGNOSIS — R79.89 OTHER SPECIFIED ABNORMAL FINDINGS OF BLOOD CHEMISTRY: ICD-10-CM

## 2023-12-18 DIAGNOSIS — G47.33 OBSTRUCTIVE SLEEP APNEA (ADULT) (PEDIATRIC): ICD-10-CM

## 2023-12-18 DIAGNOSIS — Z00.00 ENCOUNTER FOR GENERAL ADULT MEDICAL EXAMINATION W/OUT ABNORMAL FINDINGS: ICD-10-CM

## 2023-12-18 LAB
25(OH)D3 SERPL-MCNC: 24.1 NG/ML
BASOPHILS # BLD AUTO: 0.05 K/UL
BASOPHILS NFR BLD AUTO: 0.6 %
EOSINOPHIL # BLD AUTO: 0.17 K/UL
EOSINOPHIL NFR BLD AUTO: 2.1 %
HCT VFR BLD CALC: 46 %
HGB BLD-MCNC: 15.2 G/DL
IMM GRANULOCYTES NFR BLD AUTO: 0.4 %
LYMPHOCYTES # BLD AUTO: 2.03 K/UL
LYMPHOCYTES NFR BLD AUTO: 25.3 %
MAN DIFF?: NORMAL
MCHC RBC-ENTMCNC: 30 PG
MCHC RBC-ENTMCNC: 33 GM/DL
MCV RBC AUTO: 90.7 FL
MONOCYTES # BLD AUTO: 0.65 K/UL
MONOCYTES NFR BLD AUTO: 8.1 %
NEUTROPHILS # BLD AUTO: 5.1 K/UL
NEUTROPHILS NFR BLD AUTO: 63.5 %
PLATELET # BLD AUTO: 319 K/UL
RBC # BLD: 5.07 M/UL
RBC # FLD: 13.4 %
T4 FREE SERPL-MCNC: 1.2 NG/DL
TSH SERPL-ACNC: 0.56 UIU/ML
VIT B12 SERPL-MCNC: 741 PG/ML
WBC # FLD AUTO: 8.03 K/UL

## 2023-12-18 PROCEDURE — 99396 PREV VISIT EST AGE 40-64: CPT

## 2023-12-18 NOTE — PLAN
[FreeTextEntry1] : * routine general labs done * age appropriate health maintenance recommendations reviewed, discussed including healthy diet, regular exercise * patient counselled on healthy diet with limited calories, regular exercises, low fat choices, limit or eliminate junk food, and to have family meals as often as possible. * c/w Adderall * to follow up with neurologist * low cholesterol, low triglycerides diet,dietary counseling given; dietary avoidance discussed; diet and exercise reviewed with patient * referral to pulmonary medicine * f/u two months.

## 2023-12-18 NOTE — HEALTH RISK ASSESSMENT
[Good] : ~his/her~  mood as  good [No] : No [No falls in past year] : Patient reported no falls in the past year [0] : 2) Feeling down, depressed, or hopeless: Not at all (0) [PHQ-2 Negative - No further assessment needed] : PHQ-2 Negative - No further assessment needed [HIV test declined] : HIV test declined [Hepatitis C test declined] : Hepatitis C test declined [None] : None [With Family] : lives with family [Employed] : employed [College] : College [Significant Other] : lives with significant other [Sexually Active] : sexually active [Feels Safe at Home] : Feels safe at home [Fully functional (bathing, dressing, toileting, transferring, walking, feeding)] : Fully functional (bathing, dressing, toileting, transferring, walking, feeding) [Fully functional (using the telephone, shopping, preparing meals, housekeeping, doing laundry, using] : Fully functional and needs no help or supervision to perform IADLs (using the telephone, shopping, preparing meals, housekeeping, doing laundry, using transportation, managing medications and managing finances) [Smoke Detector] : smoke detector [Carbon Monoxide Detector] : carbon monoxide detector [Seat Belt] :  uses seat belt [Never] : Never [[id number]] : 0 [Change in mental status noted] : No change in mental status noted [Reports changes in hearing] : Reports no changes in hearing [Reports changes in vision] : Reports no changes in vision [Reports changes in dental health] : Reports no changes in dental health [Guns at Home] : 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

## 2023-12-19 LAB
ALBUMIN SERPL ELPH-MCNC: 4.6 G/DL
ALP BLD-CCNC: 83 U/L
ALT SERPL-CCNC: 25 U/L
ANION GAP SERPL CALC-SCNC: 14 MMOL/L
APPEARANCE: CLEAR
AST SERPL-CCNC: 20 U/L
BACTERIA: NEGATIVE /HPF
BILIRUB SERPL-MCNC: 0.3 MG/DL
BILIRUBIN URINE: NEGATIVE
BLOOD URINE: NEGATIVE
BUN SERPL-MCNC: 9 MG/DL
CALCIUM SERPL-MCNC: 9.5 MG/DL
CAST: 0 /LPF
CHLORIDE SERPL-SCNC: 103 MMOL/L
CHOLEST SERPL-MCNC: 170 MG/DL
CO2 SERPL-SCNC: 23 MMOL/L
COLOR: YELLOW
CREAT SERPL-MCNC: 0.83 MG/DL
EGFR: 111 ML/MIN/1.73M2
EPITHELIAL CELLS: 0 /HPF
ESTIMATED AVERAGE GLUCOSE: 103 MG/DL
GLUCOSE QUALITATIVE U: NEGATIVE MG/DL
GLUCOSE SERPL-MCNC: 79 MG/DL
HBA1C MFR BLD HPLC: 5.2 %
HDLC SERPL-MCNC: 58 MG/DL
KETONES URINE: NEGATIVE MG/DL
LDLC SERPL CALC-MCNC: 98 MG/DL
LEUKOCYTE ESTERASE URINE: NEGATIVE
MICROSCOPIC-UA: NORMAL
NITRITE URINE: NEGATIVE
NONHDLC SERPL-MCNC: 112 MG/DL
PH URINE: 7
POTASSIUM SERPL-SCNC: 4.1 MMOL/L
PROT SERPL-MCNC: 7.4 G/DL
PROTEIN URINE: NEGATIVE MG/DL
RED BLOOD CELLS URINE: 0 /HPF
SODIUM SERPL-SCNC: 140 MMOL/L
SPECIFIC GRAVITY URINE: 1.02
TRIGL SERPL-MCNC: 76 MG/DL
UROBILINOGEN URINE: 0.2 MG/DL
WHITE BLOOD CELLS URINE: 0 /HPF

## 2023-12-24 ENCOUNTER — RX RENEWAL (OUTPATIENT)
Age: 43
End: 2023-12-24

## 2024-01-03 ENCOUNTER — APPOINTMENT (OUTPATIENT)
Dept: OTOLARYNGOLOGY | Facility: CLINIC | Age: 44
End: 2024-01-03
Payer: COMMERCIAL

## 2024-01-03 DIAGNOSIS — R09.82 POSTNASAL DRIP: ICD-10-CM

## 2024-01-03 DIAGNOSIS — R04.0 EPISTAXIS: ICD-10-CM

## 2024-01-03 PROCEDURE — 99214 OFFICE O/P EST MOD 30 MIN: CPT

## 2024-01-03 RX ORDER — AZELASTINE HYDROCHLORIDE 137 UG/1
137 SPRAY, METERED NASAL
Qty: 1 | Refills: 5 | Status: ACTIVE | COMMUNITY
Start: 2024-01-03 | End: 1900-01-01

## 2024-01-03 NOTE — ASSESSMENT
[FreeTextEntry1] : Epistaxis: - Cauterization of granuloma x 2, now resolved - No longer using emollients - Emollients as needed  Postnasal drip: - History of allergic rhinitis - Will try to avoid Flonase given recent bleeding - Start with azelastine - Follow-up annually for refills or sooner if unsuccessful in treating postnasal drip

## 2024-01-03 NOTE — HISTORY OF PRESENT ILLNESS
[de-identified] : 44 yo patient with recurrent nosebleeds on the right side since septoplasty surgery about 10 years ago. He believes that allergies are causing the issue. He does not use any nasal sprays right now  He also thinks that he has nerve damage from the septoplasty as he is numb inside the right nostril up to a certain pain.  Had initial round of cauterization 6 weeks prior due to granuloma found on exam, returned 2 weeks later and had secondary cauterization due to minimal epistaxis.   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

## 2024-01-03 NOTE — PHYSICAL EXAM
[Nasal Endoscopy Performed] : nasal endoscopy was performed, see procedure section for findings [Normal] : no abnormal secretions [de-identified] : Well-healed previous cautery site right anterior septum

## 2024-01-03 NOTE — END OF VISIT
[FreeTextEntry3] : I personally saw and examined ROSETTA GREENWOOD in detail.  I spoke to DESI Lazaro regarding the assessment and plan of care. I performed the procedures and relevant physical exam.  I have reviewed the above assessment and plan of care and I agree.  I have made changes to the body of the note wherever necessary and appropriate.

## 2024-01-23 ENCOUNTER — TRANSCRIPTION ENCOUNTER (OUTPATIENT)
Age: 44
End: 2024-01-23

## 2024-01-29 ENCOUNTER — NON-APPOINTMENT (OUTPATIENT)
Age: 44
End: 2024-01-29

## 2024-02-06 ENCOUNTER — TRANSCRIPTION ENCOUNTER (OUTPATIENT)
Age: 44
End: 2024-02-06

## 2024-02-21 ENCOUNTER — APPOINTMENT (OUTPATIENT)
Dept: INTERNAL MEDICINE | Facility: CLINIC | Age: 44
End: 2024-02-21
Payer: COMMERCIAL

## 2024-02-21 VITALS
DIASTOLIC BLOOD PRESSURE: 86 MMHG | BODY MASS INDEX: 41.75 KG/M2 | RESPIRATION RATE: 17 BRPM | WEIGHT: 315 LBS | HEIGHT: 73 IN | SYSTOLIC BLOOD PRESSURE: 137 MMHG | TEMPERATURE: 98 F | HEART RATE: 91 BPM | OXYGEN SATURATION: 97 %

## 2024-02-21 DIAGNOSIS — F90.9 ATTENTION-DEFICIT HYPERACTIVITY DISORDER, UNSPECIFIED TYPE: ICD-10-CM

## 2024-02-21 PROCEDURE — 99214 OFFICE O/P EST MOD 30 MIN: CPT

## 2024-02-21 RX ORDER — DEXTROAMPHETAMINE SACCHARATE, AMPHETAMINE ASPARTATE, DEXTROAMPHETAMINE SULFATE, AND AMPHETAMINE SULFATE 2.5; 2.5; 2.5; 2.5 MG/1; MG/1; MG/1; MG/1
10 TABLET ORAL
Refills: 0 | Status: DISCONTINUED | COMMUNITY
End: 2024-02-21

## 2024-04-05 ENCOUNTER — RX RENEWAL (OUTPATIENT)
Age: 44
End: 2024-04-05

## 2024-04-05 RX ORDER — VALACYCLOVIR 500 MG/1
500 TABLET, FILM COATED ORAL
Qty: 30 | Refills: 2 | Status: ACTIVE | COMMUNITY
Start: 2022-10-25 | End: 1900-01-01

## 2024-05-17 ENCOUNTER — APPOINTMENT (OUTPATIENT)
Dept: FAMILY MEDICINE | Facility: CLINIC | Age: 44
End: 2024-05-17
Payer: COMMERCIAL

## 2024-05-17 DIAGNOSIS — E66.01 MORBID (SEVERE) OBESITY DUE TO EXCESS CALORIES: ICD-10-CM

## 2024-05-17 DIAGNOSIS — U07.1 COVID-19: ICD-10-CM

## 2024-05-17 DIAGNOSIS — E55.9 VITAMIN D DEFICIENCY, UNSPECIFIED: ICD-10-CM

## 2024-05-17 PROCEDURE — 99213 OFFICE O/P EST LOW 20 MIN: CPT

## 2024-05-29 NOTE — HISTORY OF PRESENT ILLNESS
[Home] : at home, [unfilled] , at the time of the visit. [Medical Office: ([facility])___] : at the medical office located in  [Verbal consent obtained from patient] : the patient, [unfilled] [FreeTextEntry8] : ROSETTA GREENWOOD is a 43 year old male presenting with COVID tested positive. Denies fever.  Has URI symptoms, some congestion, cough, and fatigue.  Also states he is taking Vit d, due to low level in last labs.  States recently started lexapro about 1.5 months ago. States noticing improvement.   Concerned about weight gain, still eating the same amount despite not exercising anymore.   Imaging Studies Imaging Studies/Medications

## 2024-05-29 NOTE — PLAN
[FreeTextEntry1] : Drink plenty of fluids.  May take Advil/Tylenol as needed for pain/fever.  F/u if no improvement noted.   Advised on criteria for return to work after COVID-19 infection:  1. At least 5 days since onset of symptoms.  2. May end isolation at day 5 if substantial improvement noted and mostly resolved symptoms. 3. Fever-free for 24 hours (without the use of antipyretics). Referred to CDC guidelines as well.

## 2024-07-15 ENCOUNTER — RX RENEWAL (OUTPATIENT)
Age: 44
End: 2024-07-15

## 2024-07-15 DIAGNOSIS — Z01.818 ENCOUNTER FOR OTHER PREPROCEDURAL EXAMINATION: ICD-10-CM

## 2024-07-31 ENCOUNTER — NON-APPOINTMENT (OUTPATIENT)
Age: 44
End: 2024-07-31

## 2024-07-31 ENCOUNTER — APPOINTMENT (OUTPATIENT)
Dept: INTERNAL MEDICINE | Facility: CLINIC | Age: 44
End: 2024-07-31
Payer: COMMERCIAL

## 2024-07-31 VITALS
OXYGEN SATURATION: 97 % | HEART RATE: 87 BPM | DIASTOLIC BLOOD PRESSURE: 91 MMHG | TEMPERATURE: 98.1 F | BODY MASS INDEX: 41.75 KG/M2 | WEIGHT: 315 LBS | SYSTOLIC BLOOD PRESSURE: 142 MMHG | HEIGHT: 73 IN | RESPIRATION RATE: 17 BRPM

## 2024-07-31 DIAGNOSIS — E66.01 MORBID (SEVERE) OBESITY DUE TO EXCESS CALORIES: ICD-10-CM

## 2024-07-31 DIAGNOSIS — E55.9 VITAMIN D DEFICIENCY, UNSPECIFIED: ICD-10-CM

## 2024-07-31 DIAGNOSIS — Z01.818 ENCOUNTER FOR OTHER PREPROCEDURAL EXAMINATION: ICD-10-CM

## 2024-07-31 DIAGNOSIS — Z86.39 PERSONAL HISTORY OF OTHER ENDOCRINE, NUTRITIONAL AND METABOLIC DISEASE: ICD-10-CM

## 2024-07-31 PROCEDURE — 99214 OFFICE O/P EST MOD 30 MIN: CPT

## 2024-07-31 PROCEDURE — G2211 COMPLEX E/M VISIT ADD ON: CPT

## 2024-07-31 NOTE — ASSESSMENT
[Patient Optimized for Surgery] : Patient optimized for surgery [No Further Testing Recommended] : no further testing recommended [As per surgery] : as per surgery [FreeTextEntry4] : 44 years old male presents for medical clearance consultation for Achilles tendon surgery, he is medically stable clear for surgery.

## 2024-07-31 NOTE — PLAN
[FreeTextEntry1] : 1. preoperative examination * patient is medically cleared for surgery 2. morbid obesity * patient counselled on healthy diet with limited calories, regular exercises, low fat choices, limit or eliminate junk food, and to have family meals as often as possible. * he is a candidate for GLP-1 medication for weight loss * lab for a1c 3. vitamin D deficiency * lab for serum level * f/u in two months

## 2024-07-31 NOTE — HISTORY OF PRESENT ILLNESS
[No Pertinent Cardiac History] : no history of aortic stenosis, atrial fibrillation, coronary artery disease, recent myocardial infarction, or implantable device/pacemaker [Sleep Apnea] : sleep apnea [No Adverse Anesthesia Reaction] : no adverse anesthesia reaction in self or family member [(Patient denies any chest pain, claudication, dyspnea on exertion, orthopnea, palpitations or syncope)] : Patient denies any chest pain, claudication, dyspnea on exertion, orthopnea, palpitations or syncope [Asthma] : no asthma [COPD] : no COPD [Smoker] : not a smoker [Chronic Anticoagulation] : no chronic anticoagulation [Chronic Kidney Disease] : no chronic kidney disease [Diabetes] : no diabetes [FreeTextEntry1] : left Achilles tendon repair [FreeTextEntry2] : 08/07/2024 [FreeTextEntry3] : DR NIKA MÁRQUEZ [FreeTextEntry4] : 44 years old male presents for medical clearance consultation for left Achilles tendon surgery requested by DR NIKA MÁRQUEZ, patient reports feeling well, offers no complaints; preoperative labs and ECG reviewed.

## 2024-08-01 LAB
25(OH)D3 SERPL-MCNC: 26.6 NG/ML
ESTIMATED AVERAGE GLUCOSE: 103 MG/DL
HBA1C MFR BLD HPLC: 5.2 %

## 2024-10-12 ENCOUNTER — TRANSCRIPTION ENCOUNTER (OUTPATIENT)
Age: 44
End: 2024-10-12

## 2024-10-12 ENCOUNTER — INPATIENT (INPATIENT)
Facility: HOSPITAL | Age: 44
LOS: 0 days | Discharge: HOME | DRG: 914 | End: 2024-10-12
Attending: ORTHOPAEDIC SURGERY | Admitting: ORTHOPAEDIC SURGERY
Payer: COMMERCIAL

## 2024-10-12 VITALS
DIASTOLIC BLOOD PRESSURE: 87 MMHG | TEMPERATURE: 98 F | SYSTOLIC BLOOD PRESSURE: 134 MMHG | HEART RATE: 87 BPM | WEIGHT: 315 LBS | OXYGEN SATURATION: 99 % | RESPIRATION RATE: 14 BRPM | HEIGHT: 72 IN

## 2024-10-12 VITALS
SYSTOLIC BLOOD PRESSURE: 154 MMHG | OXYGEN SATURATION: 99 % | HEART RATE: 88 BPM | DIASTOLIC BLOOD PRESSURE: 86 MMHG | RESPIRATION RATE: 18 BRPM

## 2024-10-12 DIAGNOSIS — S86.012D STRAIN OF LEFT ACHILLES TENDON, SUBSEQUENT ENCOUNTER: ICD-10-CM

## 2024-10-12 DIAGNOSIS — Z87.09 PERSONAL HISTORY OF OTHER DISEASES OF THE RESPIRATORY SYSTEM: Chronic | ICD-10-CM

## 2024-10-12 DIAGNOSIS — S86.002A UNSPECIFIED INJURY OF LEFT ACHILLES TENDON, INITIAL ENCOUNTER: ICD-10-CM

## 2024-10-12 LAB
ALBUMIN SERPL ELPH-MCNC: 3.7 G/DL — SIGNIFICANT CHANGE UP (ref 3.3–5)
ALP SERPL-CCNC: 92 U/L — SIGNIFICANT CHANGE UP (ref 30–120)
ALT FLD-CCNC: 42 U/L — SIGNIFICANT CHANGE UP (ref 10–60)
ANION GAP SERPL CALC-SCNC: 7 MMOL/L — SIGNIFICANT CHANGE UP (ref 5–17)
APTT BLD: 33.6 SEC — SIGNIFICANT CHANGE UP (ref 24.5–35.6)
AST SERPL-CCNC: 20 U/L — SIGNIFICANT CHANGE UP (ref 10–40)
BASOPHILS # BLD AUTO: 0.05 K/UL — SIGNIFICANT CHANGE UP (ref 0–0.2)
BASOPHILS NFR BLD AUTO: 0.5 % — SIGNIFICANT CHANGE UP (ref 0–2)
BILIRUB SERPL-MCNC: 0.7 MG/DL — SIGNIFICANT CHANGE UP (ref 0.2–1.2)
BLD GP AB SCN SERPL QL: SIGNIFICANT CHANGE UP
BUN SERPL-MCNC: 11 MG/DL — SIGNIFICANT CHANGE UP (ref 7–23)
CALCIUM SERPL-MCNC: 9.2 MG/DL — SIGNIFICANT CHANGE UP (ref 8.4–10.5)
CHLORIDE SERPL-SCNC: 102 MMOL/L — SIGNIFICANT CHANGE UP (ref 96–108)
CO2 SERPL-SCNC: 30 MMOL/L — SIGNIFICANT CHANGE UP (ref 22–31)
CREAT SERPL-MCNC: 0.91 MG/DL — SIGNIFICANT CHANGE UP (ref 0.5–1.3)
EGFR: 107 ML/MIN/1.73M2 — SIGNIFICANT CHANGE UP
EGFR: 107 ML/MIN/1.73M2 — SIGNIFICANT CHANGE UP
EOSINOPHIL # BLD AUTO: 0.2 K/UL — SIGNIFICANT CHANGE UP (ref 0–0.5)
EOSINOPHIL NFR BLD AUTO: 2 % — SIGNIFICANT CHANGE UP (ref 0–6)
GLUCOSE SERPL-MCNC: 88 MG/DL — SIGNIFICANT CHANGE UP (ref 70–99)
HCT VFR BLD CALC: 45.8 % — SIGNIFICANT CHANGE UP (ref 39–50)
HGB BLD-MCNC: 14.9 G/DL — SIGNIFICANT CHANGE UP (ref 13–17)
IMM GRANULOCYTES NFR BLD AUTO: 0.3 % — SIGNIFICANT CHANGE UP (ref 0–0.9)
INR BLD: 1.06 RATIO — SIGNIFICANT CHANGE UP (ref 0.85–1.16)
LYMPHOCYTES # BLD AUTO: 2.31 K/UL — SIGNIFICANT CHANGE UP (ref 1–3.3)
LYMPHOCYTES # BLD AUTO: 23.2 % — SIGNIFICANT CHANGE UP (ref 13–44)
MCHC RBC-ENTMCNC: 28.8 PG — SIGNIFICANT CHANGE UP (ref 27–34)
MCHC RBC-ENTMCNC: 32.5 G/DL — SIGNIFICANT CHANGE UP (ref 32–36)
MCV RBC AUTO: 88.4 FL — SIGNIFICANT CHANGE UP (ref 80–100)
MONOCYTES # BLD AUTO: 0.77 K/UL — SIGNIFICANT CHANGE UP (ref 0–0.9)
MONOCYTES NFR BLD AUTO: 7.7 % — SIGNIFICANT CHANGE UP (ref 2–14)
NEUTROPHILS # BLD AUTO: 6.61 K/UL — SIGNIFICANT CHANGE UP (ref 1.8–7.4)
NEUTROPHILS NFR BLD AUTO: 66.3 % — SIGNIFICANT CHANGE UP (ref 43–77)
NRBC # BLD: 0 /100 WBCS — SIGNIFICANT CHANGE UP (ref 0–0)
NRBC BLD-RTO: 0 /100 WBCS — SIGNIFICANT CHANGE UP (ref 0–0)
PLATELET # BLD AUTO: 316 K/UL — SIGNIFICANT CHANGE UP (ref 150–400)
POTASSIUM SERPL-MCNC: 3.4 MMOL/L — LOW (ref 3.5–5.3)
POTASSIUM SERPL-SCNC: 3.4 MMOL/L — LOW (ref 3.5–5.3)
PROT SERPL-MCNC: 7.2 G/DL — SIGNIFICANT CHANGE UP (ref 6–8.3)
PROTHROM AB SERPL-ACNC: 12.3 SEC — SIGNIFICANT CHANGE UP (ref 9.9–13.4)
RBC # BLD: 5.18 M/UL — SIGNIFICANT CHANGE UP (ref 4.2–5.8)
RBC # FLD: 13.1 % — SIGNIFICANT CHANGE UP (ref 10.3–14.5)
SODIUM SERPL-SCNC: 139 MMOL/L — SIGNIFICANT CHANGE UP (ref 135–145)
WBC # BLD: 9.97 K/UL — SIGNIFICANT CHANGE UP (ref 3.8–10.5)
WBC # FLD AUTO: 9.97 K/UL — SIGNIFICANT CHANGE UP (ref 3.8–10.5)

## 2024-10-12 PROCEDURE — 99285 EMERGENCY DEPT VISIT HI MDM: CPT

## 2024-10-12 PROCEDURE — 71045 X-RAY EXAM CHEST 1 VIEW: CPT | Mod: 26

## 2024-10-12 PROCEDURE — 73590 X-RAY EXAM OF LOWER LEG: CPT | Mod: 26,LT

## 2024-10-12 PROCEDURE — 99284 EMERGENCY DEPT VISIT MOD MDM: CPT

## 2024-10-12 PROCEDURE — 93010 ELECTROCARDIOGRAM REPORT: CPT

## 2024-10-12 PROCEDURE — 27650 REPAIR ACHILLES TENDON: CPT | Mod: AS,LT

## 2024-10-12 PROCEDURE — 73610 X-RAY EXAM OF ANKLE: CPT | Mod: 26,LT

## 2024-10-12 DEVICE — IMPLANTABLE DEVICE: Type: IMPLANTABLE DEVICE | Site: LEFT | Status: FUNCTIONAL

## 2024-10-12 RX ORDER — OXYCODONE HYDROCHLORIDE 30 MG/1
5 TABLET ORAL ONCE
Refills: 0 | Status: DISCONTINUED | OUTPATIENT
Start: 2024-10-12 | End: 2024-10-12

## 2024-10-12 RX ORDER — HYDROMORPHONE/SOD CHLOR,ISO/PF 2 MG/10 ML
1 SYRINGE (ML) INJECTION
Refills: 0 | Status: DISCONTINUED | OUTPATIENT
Start: 2024-10-12 | End: 2024-10-12

## 2024-10-12 RX ORDER — ONDANSETRON HCL/PF 4 MG/2 ML
4 VIAL (ML) INJECTION ONCE
Refills: 0 | Status: DISCONTINUED | OUTPATIENT
Start: 2024-10-12 | End: 2024-10-12

## 2024-10-12 RX ORDER — SODIUM CHLORIDE 9 G/1000ML
1000 INJECTION, SOLUTION INTRAVENOUS
Refills: 0 | Status: DISCONTINUED | OUTPATIENT
Start: 2024-10-12 | End: 2024-10-12

## 2024-10-12 RX ORDER — CEFAZOLIN SODIUM IN 0.9 % NACL 3 G/100 ML
3000 INTRAVENOUS SOLUTION, PIGGYBACK (ML) INTRAVENOUS ONCE
Refills: 0 | Status: COMPLETED | OUTPATIENT
Start: 2024-10-12 | End: 2024-10-12

## 2024-10-12 RX ORDER — HYDROMORPHONE/SOD CHLOR,ISO/PF 2 MG/10 ML
0.5 SYRINGE (ML) INJECTION
Refills: 0 | Status: DISCONTINUED | OUTPATIENT
Start: 2024-10-12 | End: 2024-10-12

## 2024-10-12 RX ORDER — ACETAMINOPHEN 500 MG/5ML
1000 LIQUID (ML) ORAL ONCE
Refills: 0 | Status: COMPLETED | OUTPATIENT
Start: 2024-10-12 | End: 2024-10-12

## 2024-10-12 RX ORDER — DEXTROAMPHETAMINE SACCHARATE, AMPHETAMINE ASPARTATE MONOHYDRATE, DEXTROAMPHETAMINE SULFATE AND AMPHETAMINE SULFATE 2.5; 2.5; 2.5; 2.5 MG/1; MG/1; MG/1; MG/1
1 CAPSULE, EXTENDED RELEASE ORAL
Refills: 0 | DISCHARGE

## 2024-10-12 RX ADMIN — Medication 400 MILLIGRAM(S): at 12:30

## 2024-10-12 RX ADMIN — Medication 1000 MILLIGRAM(S): at 16:05

## 2024-10-12 RX ADMIN — Medication 200 MILLIGRAM(S): at 12:10

## 2024-10-12 RX ADMIN — SODIUM CHLORIDE 75 MILLILITER(S): 9 INJECTION, SOLUTION INTRAVENOUS at 15:00

## 2024-10-13 PROCEDURE — 88300 SURGICAL PATH GROSS: CPT | Mod: 26

## 2024-10-14 PROCEDURE — 86850 RBC ANTIBODY SCREEN: CPT

## 2024-10-14 PROCEDURE — 71045 X-RAY EXAM CHEST 1 VIEW: CPT

## 2024-10-14 PROCEDURE — 86901 BLOOD TYPING SEROLOGIC RH(D): CPT

## 2024-10-14 PROCEDURE — 99285 EMERGENCY DEPT VISIT HI MDM: CPT

## 2024-10-14 PROCEDURE — 85025 COMPLETE CBC W/AUTO DIFF WBC: CPT

## 2024-10-14 PROCEDURE — 85610 PROTHROMBIN TIME: CPT

## 2024-10-14 PROCEDURE — 73610 X-RAY EXAM OF ANKLE: CPT

## 2024-10-14 PROCEDURE — 93005 ELECTROCARDIOGRAM TRACING: CPT

## 2024-10-14 PROCEDURE — 80053 COMPREHEN METABOLIC PANEL: CPT

## 2024-10-14 PROCEDURE — 73590 X-RAY EXAM OF LOWER LEG: CPT

## 2024-10-14 PROCEDURE — 85730 THROMBOPLASTIN TIME PARTIAL: CPT

## 2024-10-14 PROCEDURE — 97161 PT EVAL LOW COMPLEX 20 MIN: CPT

## 2024-10-14 PROCEDURE — 76000 FLUOROSCOPY <1 HR PHYS/QHP: CPT

## 2024-10-14 PROCEDURE — 36415 COLL VENOUS BLD VENIPUNCTURE: CPT

## 2024-10-14 PROCEDURE — 96374 THER/PROPH/DIAG INJ IV PUSH: CPT

## 2024-10-14 PROCEDURE — 96375 TX/PRO/DX INJ NEW DRUG ADDON: CPT

## 2024-10-14 PROCEDURE — 88300 SURGICAL PATH GROSS: CPT

## 2024-10-14 PROCEDURE — C1713: CPT

## 2024-10-14 PROCEDURE — 86900 BLOOD TYPING SEROLOGIC ABO: CPT

## 2024-11-06 ENCOUNTER — APPOINTMENT (OUTPATIENT)
Dept: INTERNAL MEDICINE | Facility: CLINIC | Age: 44
End: 2024-11-06
Payer: COMMERCIAL

## 2024-11-06 VITALS
HEIGHT: 73 IN | SYSTOLIC BLOOD PRESSURE: 133 MMHG | OXYGEN SATURATION: 97 % | HEART RATE: 102 BPM | RESPIRATION RATE: 17 BRPM | WEIGHT: 315 LBS | DIASTOLIC BLOOD PRESSURE: 90 MMHG | BODY MASS INDEX: 41.75 KG/M2 | TEMPERATURE: 98 F

## 2024-11-06 DIAGNOSIS — Z98.890 OTHER SPECIFIED POSTPROCEDURAL STATES: ICD-10-CM

## 2024-11-06 DIAGNOSIS — E66.01 MORBID (SEVERE) OBESITY DUE TO EXCESS CALORIES: ICD-10-CM

## 2024-11-06 DIAGNOSIS — R79.89 OTHER SPECIFIED ABNORMAL FINDINGS OF BLOOD CHEMISTRY: ICD-10-CM

## 2024-11-06 PROCEDURE — G2211 COMPLEX E/M VISIT ADD ON: CPT | Mod: NC

## 2024-11-06 PROCEDURE — 99214 OFFICE O/P EST MOD 30 MIN: CPT

## 2024-12-23 ENCOUNTER — APPOINTMENT (OUTPATIENT)
Dept: INTERNAL MEDICINE | Facility: CLINIC | Age: 44
End: 2024-12-23
Payer: COMMERCIAL

## 2024-12-23 DIAGNOSIS — D72.821 MONOCYTOSIS (SYMPTOMATIC): ICD-10-CM

## 2024-12-23 DIAGNOSIS — R79.89 OTHER SPECIFIED ABNORMAL FINDINGS OF BLOOD CHEMISTRY: ICD-10-CM

## 2024-12-23 DIAGNOSIS — E55.9 VITAMIN D DEFICIENCY, UNSPECIFIED: ICD-10-CM

## 2024-12-23 PROCEDURE — 99214 OFFICE O/P EST MOD 30 MIN: CPT

## 2024-12-23 PROCEDURE — G2211 COMPLEX E/M VISIT ADD ON: CPT | Mod: NC

## 2025-01-21 ENCOUNTER — APPOINTMENT (OUTPATIENT)
Dept: INTERNAL MEDICINE | Facility: CLINIC | Age: 45
End: 2025-01-21
Payer: COMMERCIAL

## 2025-01-21 VITALS
RESPIRATION RATE: 17 BRPM | HEART RATE: 87 BPM | OXYGEN SATURATION: 96 % | BODY MASS INDEX: 41.75 KG/M2 | WEIGHT: 315 LBS | DIASTOLIC BLOOD PRESSURE: 80 MMHG | SYSTOLIC BLOOD PRESSURE: 142 MMHG | HEIGHT: 73 IN | TEMPERATURE: 97.3 F

## 2025-01-21 DIAGNOSIS — E66.01 MORBID (SEVERE) OBESITY DUE TO EXCESS CALORIES: ICD-10-CM

## 2025-01-21 DIAGNOSIS — E55.9 VITAMIN D DEFICIENCY, UNSPECIFIED: ICD-10-CM

## 2025-01-21 DIAGNOSIS — D72.821 MONOCYTOSIS (SYMPTOMATIC): ICD-10-CM

## 2025-01-21 DIAGNOSIS — F90.9 ATTENTION-DEFICIT HYPERACTIVITY DISORDER, UNSPECIFIED TYPE: ICD-10-CM

## 2025-01-21 DIAGNOSIS — R79.89 OTHER SPECIFIED ABNORMAL FINDINGS OF BLOOD CHEMISTRY: ICD-10-CM

## 2025-01-21 PROCEDURE — 99214 OFFICE O/P EST MOD 30 MIN: CPT

## 2025-01-21 PROCEDURE — G2211 COMPLEX E/M VISIT ADD ON: CPT | Mod: NC

## 2025-02-21 ENCOUNTER — NON-APPOINTMENT (OUTPATIENT)
Age: 45
End: 2025-02-21

## 2025-03-19 ENCOUNTER — RX RENEWAL (OUTPATIENT)
Age: 45
End: 2025-03-19

## 2025-06-26 ENCOUNTER — RX RENEWAL (OUTPATIENT)
Age: 45
End: 2025-06-26

## 2025-07-31 ENCOUNTER — APPOINTMENT (OUTPATIENT)
Dept: INTERNAL MEDICINE | Facility: CLINIC | Age: 45
End: 2025-07-31
Payer: COMMERCIAL

## 2025-07-31 VITALS
RESPIRATION RATE: 17 BRPM | HEIGHT: 73 IN | OXYGEN SATURATION: 98 % | WEIGHT: 315 LBS | SYSTOLIC BLOOD PRESSURE: 144 MMHG | TEMPERATURE: 97.8 F | HEART RATE: 83 BPM | DIASTOLIC BLOOD PRESSURE: 90 MMHG | BODY MASS INDEX: 41.75 KG/M2

## 2025-07-31 DIAGNOSIS — F90.9 ATTENTION-DEFICIT HYPERACTIVITY DISORDER, UNSPECIFIED TYPE: ICD-10-CM

## 2025-07-31 DIAGNOSIS — E55.9 VITAMIN D DEFICIENCY, UNSPECIFIED: ICD-10-CM

## 2025-07-31 DIAGNOSIS — G47.33 OBSTRUCTIVE SLEEP APNEA (ADULT) (PEDIATRIC): ICD-10-CM

## 2025-07-31 DIAGNOSIS — D72.821 MONOCYTOSIS (SYMPTOMATIC): ICD-10-CM

## 2025-07-31 DIAGNOSIS — R79.89 OTHER SPECIFIED ABNORMAL FINDINGS OF BLOOD CHEMISTRY: ICD-10-CM

## 2025-07-31 DIAGNOSIS — E66.01 MORBID (SEVERE) OBESITY DUE TO EXCESS CALORIES: ICD-10-CM

## 2025-07-31 PROCEDURE — G2211 COMPLEX E/M VISIT ADD ON: CPT | Mod: NC

## 2025-07-31 PROCEDURE — 99214 OFFICE O/P EST MOD 30 MIN: CPT

## 2025-07-31 RX ORDER — TIRZEPATIDE 2.5 MG/.5ML
2.5 INJECTION, SOLUTION SUBCUTANEOUS
Qty: 1 | Refills: 1 | Status: ACTIVE | COMMUNITY
Start: 2025-07-31 | End: 1900-01-01

## 2025-09-10 ENCOUNTER — TRANSCRIPTION ENCOUNTER (OUTPATIENT)
Age: 45
End: 2025-09-10

## (undated) DEVICE — SAW BLADE STRYKER PRECISION THIN MEDIUM NARROW BLADE 5.5MM X 18MM

## (undated) DEVICE — BLADE SCALPEL SAFETYLOCK #15

## (undated) DEVICE — DRSG CURITY GAUZE SPONGE 4 X 4" 12-PLY

## (undated) DEVICE — SUT FIBERWIRE #2 38" STRAND 1 BLUE 1 WHITE/BLACK

## (undated) DEVICE — VENODYNE/SCD SLEEVE CALF MEDIUM

## (undated) DEVICE — DRSG WEBRIL 4"

## (undated) DEVICE — CANISTER SUCTION LID GUARD 3000CC

## (undated) DEVICE — SUT TAPE 1.3MM WH/BL 36.6MM RV CT NDL

## (undated) DEVICE — SUT POLYSORB 0 36" GS-21 UNDYED

## (undated) DEVICE — TOURNIQUET ESMARK 6"

## (undated) DEVICE — POSITIONER STRAP ARMBOARD VELCRO TS-30

## (undated) DEVICE — SUT NDL REGULAR SURGEON 1/2 CIRCLE REVERSE CUTTING 0.055" X 2.953"

## (undated) DEVICE — DRSG WEBRIL 6"

## (undated) DEVICE — SUT POLYSORB 0 30" GS-10 UNDYED

## (undated) DEVICE — SUT MONOSOF 3-0 18" C-14

## (undated) DEVICE — SUT PDS II 0 36" CT-1

## (undated) DEVICE — SUT POLYSORB 2-0 30" C-14 UNDYED

## (undated) DEVICE — DRAPE C ARM MINI

## (undated) DEVICE — SUT VICRYL PLUS 3-0 27" RB-1 UNDYED

## (undated) DEVICE — SOL IRR POUR H2O 1500ML

## (undated) DEVICE — DRSG ACE BANDAGE 6"

## (undated) DEVICE — SPECIMEN CONTAINER 100ML

## (undated) DEVICE — SUT POLYSORB 2-0 30" V-20 UNDYED

## (undated) DEVICE — SOL IRR POUR NS 0.9% 500ML

## (undated) DEVICE — SUT MONOSOF 3-0 18" PC-12

## (undated) DEVICE — SOLIDIFIER CANN EXPRESS 3K

## (undated) DEVICE — DRSG XEROFORM 5 X 9"

## (undated) DEVICE — SUT WIRE # 2 TIGERLOOP

## (undated) DEVICE — SUT POLYSORB 2-0 30" GS-11 UNDYED

## (undated) DEVICE — BLADE SCALPEL SAFETYLOCK #10

## (undated) DEVICE — DRAPE 3/4 SHEET 52X76"

## (undated) DEVICE — SUT FIBERWIRE #2 38" STRAND 1 BLUE T-5 TAPER

## (undated) DEVICE — SUT ETHIBOND 2-0 30" SH

## (undated) DEVICE — PACK FOOT CUST

## (undated) DEVICE — PACK LOWER EXTREMITY

## (undated) DEVICE — DRSG XEROFORM 1 X 8"

## (undated) DEVICE — GLV 8 PROTEXIS (BLUE)

## (undated) DEVICE — TOURNIQUET CUFF 34" SINGLE PORT W PLC (BLACK)

## (undated) DEVICE — LAP PAD 18 X 18"

## (undated) DEVICE — TOURNIQUET ESMARK 4"

## (undated) DEVICE — WARMING BLANKET UPPER ADULT

## (undated) DEVICE — POSITIONER FOAM HEAD CRADLE (PINK)